# Patient Record
Sex: MALE | Race: BLACK OR AFRICAN AMERICAN | NOT HISPANIC OR LATINO | URBAN - METROPOLITAN AREA
[De-identification: names, ages, dates, MRNs, and addresses within clinical notes are randomized per-mention and may not be internally consistent; named-entity substitution may affect disease eponyms.]

---

## 2023-12-21 ENCOUNTER — LAB OUTSIDE AN ENCOUNTER (OUTPATIENT)
Dept: URBAN - METROPOLITAN AREA CLINIC 63 | Facility: CLINIC | Age: 73
End: 2023-12-21

## 2023-12-21 ENCOUNTER — OFFICE VISIT (OUTPATIENT)
Dept: URBAN - METROPOLITAN AREA CLINIC 63 | Facility: CLINIC | Age: 73
End: 2023-12-21
Payer: MEDICARE

## 2023-12-21 VITALS
DIASTOLIC BLOOD PRESSURE: 82 MMHG | HEIGHT: 68 IN | SYSTOLIC BLOOD PRESSURE: 130 MMHG | OXYGEN SATURATION: 96 % | HEART RATE: 93 BPM | TEMPERATURE: 97.2 F | BODY MASS INDEX: 21.22 KG/M2 | RESPIRATION RATE: 20 BRPM | WEIGHT: 140 LBS

## 2023-12-21 DIAGNOSIS — R63.4 WEIGHT LOSS: ICD-10-CM

## 2023-12-21 PROCEDURE — 99204 OFFICE O/P NEW MOD 45 MIN: CPT | Performed by: INTERNAL MEDICINE

## 2023-12-21 NOTE — HPI-TODAY'S VISIT:
Tyree is a 73-year-old male that presents today as a new patient.  Referred to our office for weight loss, nausea, decreased appetite.  He reports about 20 pound weight loss over the past year.  He says that he has had a decreased appetite.  He is eating much less.  Denies abdominal pain.  He sometimes has postprandial nausea.  Denies vomiting.  Denies blood in the stool, melena, hematochezia.  Denies jaundice.  Denies diarrhea.  He reports having a recent CT scan which was ordered by his primary care physician.  These records are not available at this time.  I did search [facility] and there was no CT scan report.  Will need to request these records from his primary care physician.  Labs from 10/2023 at [facility] were reviewed.  LFTs were normal.  He had mild renal insufficiency with creatinine 1.24.  He had a normocytic anemia with MCV 89, hemoglobin 11.2, hematocrit 33.5.  Denies prior colonoscopy.  He reports Cologuard about 2 years ago which was negative.  No prior EGD.  Denies family history of GI malignancy.  He denies having any other medical problems.

## 2024-01-02 ENCOUNTER — TELEPHONE ENCOUNTER (OUTPATIENT)
Dept: URBAN - METROPOLITAN AREA CLINIC 63 | Facility: CLINIC | Age: 74
End: 2024-01-02

## 2024-01-11 LAB
% SATURATION: 36
A/G RATIO: 1
ALBUMIN: 3.9
ALKALINE PHOSPHATASE: 59
ALT (SGPT): 7
AST (SGOT): 12
BILIRUBIN, TOTAL: 0.8
BUN/CREATININE RATIO: 9
BUN: 12
CALCIUM: 9.2
CARBON DIOXIDE, TOTAL: 26
CHLORIDE: 105
CREATININE: 1.3
EGFR: 58
FERRITIN: 156
GLOBULIN, TOTAL: 3.8
GLUCOSE: 83
HEMATOCRIT: 33.4
HEMOGLOBIN: 11.2
IRON BINDING CAPACITY: 246
IRON, TOTAL: 89
MCH: 29.9
MCHC: 33.5
MCV: 89.3
MPV: 9.4
PLATELET COUNT: 307
POTASSIUM: 4.4
PROTEIN, TOTAL: 7.7
RDW: 12.4
RED BLOOD CELL COUNT: 3.74
SODIUM: 139
WHITE BLOOD CELL COUNT: 6.7

## 2024-01-31 ENCOUNTER — CLAIMS CREATED FROM THE CLAIM WINDOW (OUTPATIENT)
Dept: URBAN - METROPOLITAN AREA SURGERY CENTER 4 | Facility: SURGERY CENTER | Age: 74
End: 2024-01-31
Payer: MEDICARE

## 2024-01-31 ENCOUNTER — CLAIMS CREATED FROM THE CLAIM WINDOW (OUTPATIENT)
Dept: URBAN - METROPOLITAN AREA CLINIC 4 | Facility: CLINIC | Age: 74
End: 2024-01-31
Payer: MEDICARE

## 2024-01-31 DIAGNOSIS — K31.89 OTHER DISEASES OF STOMACH AND DUODENUM: ICD-10-CM

## 2024-01-31 DIAGNOSIS — R63.4 ABNORMAL WEIGHT LOSS: ICD-10-CM

## 2024-01-31 DIAGNOSIS — K64.0 FIRST DEGREE HEMORRHOIDS: ICD-10-CM

## 2024-01-31 DIAGNOSIS — K31.7 POLYP OF STOMACH AND DUODENUM: ICD-10-CM

## 2024-01-31 DIAGNOSIS — K22.89 OTHER SPECIFIED DISEASE OF ESOPHAGUS: ICD-10-CM

## 2024-01-31 DIAGNOSIS — K29.60 OTHER GASTRITIS WITHOUT BLEEDING: ICD-10-CM

## 2024-01-31 DIAGNOSIS — K57.30 DIVERTICULOSIS OF LARGE INTESTINE WITHOUT PERFORATION OR ABSCESS WITHOUT BLEEDING: ICD-10-CM

## 2024-01-31 DIAGNOSIS — K29.70 GASTRITIS WITHOUT BLEEDING, UNSPECIFIED CHRONICITY, UNSPECIFIED GASTRITIS TYPE: ICD-10-CM

## 2024-01-31 DIAGNOSIS — K57.30 DIVERTICULA, COLON: ICD-10-CM

## 2024-01-31 DIAGNOSIS — R63.4 WEIGHT LOSS: ICD-10-CM

## 2024-01-31 DIAGNOSIS — K29.70 GASTRITIS: ICD-10-CM

## 2024-01-31 DIAGNOSIS — B96.81 HELICOBACTER PYLORI [H. PYLORI] AS THE CAUSE OF DISEASES CLASSIFIED ELSEWHERE: ICD-10-CM

## 2024-01-31 DIAGNOSIS — K31.7 GASTRIC POLYP: ICD-10-CM

## 2024-01-31 PROCEDURE — 43239 EGD BIOPSY SINGLE/MULTIPLE: CPT | Performed by: CLINIC/CENTER

## 2024-01-31 PROCEDURE — 88313 SPECIAL STAINS GROUP 2: CPT | Performed by: PATHOLOGY

## 2024-01-31 PROCEDURE — 88342 IMHCHEM/IMCYTCHM 1ST ANTB: CPT | Performed by: PATHOLOGY

## 2024-01-31 PROCEDURE — 88305 TISSUE EXAM BY PATHOLOGIST: CPT | Performed by: PATHOLOGY

## 2024-01-31 PROCEDURE — 88312 SPECIAL STAINS GROUP 1: CPT | Performed by: PATHOLOGY

## 2024-01-31 PROCEDURE — 88341 IMHCHEM/IMCYTCHM EA ADD ANTB: CPT | Performed by: PATHOLOGY

## 2024-01-31 PROCEDURE — 45378 DIAGNOSTIC COLONOSCOPY: CPT | Performed by: CLINIC/CENTER

## 2024-01-31 PROCEDURE — 45378 DIAGNOSTIC COLONOSCOPY: CPT | Performed by: INTERNAL MEDICINE

## 2024-01-31 PROCEDURE — 43239 EGD BIOPSY SINGLE/MULTIPLE: CPT | Performed by: INTERNAL MEDICINE

## 2024-01-31 PROCEDURE — 00813 ANES UPR LWR GI NDSC PX: CPT | Performed by: NURSE ANESTHETIST, CERTIFIED REGISTERED

## 2024-02-15 ENCOUNTER — OV EP (OUTPATIENT)
Dept: URBAN - METROPOLITAN AREA CLINIC 63 | Facility: CLINIC | Age: 74
End: 2024-02-15
Payer: MEDICARE

## 2024-02-15 VITALS
WEIGHT: 138.8 LBS | HEART RATE: 76 BPM | BODY MASS INDEX: 21.04 KG/M2 | TEMPERATURE: 97.8 F | HEIGHT: 68 IN | OXYGEN SATURATION: 98 % | DIASTOLIC BLOOD PRESSURE: 70 MMHG | SYSTOLIC BLOOD PRESSURE: 120 MMHG

## 2024-02-15 DIAGNOSIS — A04.8 H. PYLORI INFECTION: ICD-10-CM

## 2024-02-15 DIAGNOSIS — K29.70 GASTRITIS WITHOUT BLEEDING, UNSPECIFIED CHRONICITY, UNSPECIFIED GASTRITIS TYPE: ICD-10-CM

## 2024-02-15 PROBLEM — 4556007: Status: ACTIVE | Noted: 2024-02-15

## 2024-02-15 PROCEDURE — 99214 OFFICE O/P EST MOD 30 MIN: CPT | Performed by: INTERNAL MEDICINE

## 2024-02-15 RX ORDER — CLARITHROMYCIN 500 MG/1
1 TABLET TABLET, FILM COATED ORAL
Qty: 28 TABLET | Refills: 0 | OUTPATIENT
Start: 2024-02-15 | End: 2024-02-29

## 2024-02-15 RX ORDER — AMOXICILLIN 500 MG/1
2 CAPSULES CAPSULE ORAL TWICE A DAY
Qty: 56 CAPSULE | Refills: 0 | OUTPATIENT
Start: 2024-02-15 | End: 2024-02-29

## 2024-02-15 RX ORDER — OMEPRAZOLE 40 MG/1
1 CAPSULE CAPSULE, DELAYED RELEASE ORAL TWICE A DAY
Qty: 28 CAPSULE | Refills: 0 | OUTPATIENT
Start: 2024-02-15

## 2024-02-15 NOTE — HPI-TODAY'S VISIT:
Tyree is a 73-year-old male that presents today for follow-up.  Initially seen in the office 12/2023.  At that time he was complaining of weight loss, nausea, and decreased appetite.  He reported about 20 pound weight loss over the past year.  He said that due to his decreased appetite his he was eating much less.  Labs from 10/23 revealed mild renal insufficiency with creatinine 1.24 and mild normocytic anemia with hemoglobin 11.2.  At his initial visit, repeat labs with CBC, CMP, and iron studies were ordered.  He was also scheduled for EGD and colonoscopy. He underwent EGD and colonoscopy 01/31/2024.  EGD revealed an irregular Z-line at the gastroesophageal junction, moderate inflammation in the antrum and prepyloric region, small polyp in the antrum which was removed, atrophic mucosa in the gastric body, and normal duodenum.  Biopsies were positive for H. pylori.  Colonoscopy revealed diverticulosis throughout the entire colon, diverticulum in the ileum, and internal hemorrhoids. Repeat labs 01/2024:Ferritin 156, iron saturation 36, creatinine 1.3, albumin 3.9, bilirubin 0.8, alkaline phosphatase 59, AST 12, ALT 7, WBC 6.7, hemoglobin 11.2, MCV 89, platelet 307. CT abdomen and pelvis without contrast 10/2023:Nephrolithiasis without obstruction.  Gallbladder and pancreas normal.  Liver cyst measuring 10 mm.

## 2024-04-25 ENCOUNTER — OV EP (OUTPATIENT)
Dept: URBAN - METROPOLITAN AREA CLINIC 63 | Facility: CLINIC | Age: 74
End: 2024-04-25
Payer: MEDICARE

## 2024-04-25 VITALS
OXYGEN SATURATION: 98 % | DIASTOLIC BLOOD PRESSURE: 76 MMHG | HEIGHT: 68 IN | WEIGHT: 150 LBS | HEART RATE: 71 BPM | SYSTOLIC BLOOD PRESSURE: 120 MMHG | TEMPERATURE: 97.9 F | BODY MASS INDEX: 22.73 KG/M2

## 2024-04-25 DIAGNOSIS — R63.4 WEIGHT LOSS: ICD-10-CM

## 2024-04-25 DIAGNOSIS — A04.8 H. PYLORI INFECTION: ICD-10-CM

## 2024-04-25 DIAGNOSIS — D64.9 CHRONIC ANEMIA: ICD-10-CM

## 2024-04-25 PROCEDURE — 99214 OFFICE O/P EST MOD 30 MIN: CPT

## 2024-04-25 RX ORDER — OMEPRAZOLE 40 MG/1
1 CAPSULE CAPSULE, DELAYED RELEASE ORAL TWICE A DAY
Qty: 28 CAPSULE | Refills: 0 | Status: DISCONTINUED | COMMUNITY
Start: 2024-02-15

## 2024-04-25 NOTE — HPI-PREVIOUS IMAGING
10/18/2023 CT abdomen pelvis with out contrast consistent with right nephrolithiasis without obstruction No explanation for weight loss No specimens collected Recall for 10 years.

## 2024-04-25 NOTE — HPI-PREVIOUS LABS
1/10/2024 iron panel significant for TIBC 246, CMP significant for creatinine 1.30, EGFR 58, globulin 3.8, ALT 7, CBC significant for RBC 3.74, hemoglobin 11.2, hematocrit 33.4

## 2024-04-25 NOTE — HPI-ZZZTODAY'S VISIT
Patient is a very pleasant 73-year-old male who presents for follow-up.  He is previously a patient of Dr. Quintanilla.  He will be establishing with Dr. Dr. Damon today.  Past medical history significant for mild renal insufficiency, history of H. pylori.  Denies previous surgery.  Last colonoscopy and endoscopy 1/31/2024.  Family history significant for maternal liver disease. At his previous office visit patient was given treatment for H. pylori.  If he continued to lose weight recommendation was to repeat imaging with contrast Patient presents for follow-up.  He states he completed his antibiotic regimen.  He is now asymptomatic from a GI perspective.  He is able to gain about 5 pounds since being treated.  He denies dysphagia, dyspepsia, pyrosis, abdominal pain, change in bowel habits, unintentional weight loss.

## 2024-04-25 NOTE — HPI-PREVIOUS PROCEDURES
1/31/2024 EGD consistent with normal esophagus Z-line irregular at GE junction Gastritis Single gastric polyp Gastric mucosal atrophy Normal duodenal bulb and second portion of duodenum Biopsies consistent with H. pylori identified with no intestinal metaplasia. Colonoscopy 1/31/2024 consistent with nonbleeding internal hemorrhoids Diverticulosis in the entire examined colon Ileal diverticula